# Patient Record
Sex: FEMALE | Race: WHITE | Employment: PART TIME | URBAN - METROPOLITAN AREA
[De-identification: names, ages, dates, MRNs, and addresses within clinical notes are randomized per-mention and may not be internally consistent; named-entity substitution may affect disease eponyms.]

---

## 2017-01-18 RX ORDER — LOSARTAN POTASSIUM 25 MG/1
TABLET ORAL
Qty: 90 TABLET | Refills: 0 | Status: CANCELLED | OUTPATIENT
Start: 2017-01-18

## 2017-01-20 RX ORDER — LOSARTAN POTASSIUM 25 MG/1
25 TABLET ORAL DAILY
Qty: 90 TABLET | Refills: 0 | Status: SHIPPED | OUTPATIENT
Start: 2017-01-20 | End: 2017-04-03

## 2017-01-25 ENCOUNTER — TELEPHONE (OUTPATIENT)
Dept: FAMILY MEDICINE CLINIC | Facility: CLINIC | Age: 52
End: 2017-01-25

## 2017-01-25 NOTE — TELEPHONE ENCOUNTER
Pt dropped off paperwork to be signed and may be dated with last exam date. Pt would like a call back at [phone]. Pt ok to leave .  Paperwork is in 's bin

## 2017-01-31 ENCOUNTER — OFFICE VISIT (OUTPATIENT)
Dept: FAMILY MEDICINE CLINIC | Facility: CLINIC | Age: 52
End: 2017-01-31

## 2017-01-31 VITALS
SYSTOLIC BLOOD PRESSURE: 120 MMHG | OXYGEN SATURATION: 98 % | BODY MASS INDEX: 27.74 KG/M2 | HEART RATE: 77 BPM | HEIGHT: 68 IN | DIASTOLIC BLOOD PRESSURE: 70 MMHG | WEIGHT: 183 LBS | TEMPERATURE: 99 F | RESPIRATION RATE: 16 BRPM

## 2017-01-31 DIAGNOSIS — Z12.11 SCREENING FOR COLON CANCER: ICD-10-CM

## 2017-01-31 DIAGNOSIS — Z23 NEED FOR VACCINATION: ICD-10-CM

## 2017-01-31 DIAGNOSIS — I10 ESSENTIAL HYPERTENSION: ICD-10-CM

## 2017-01-31 DIAGNOSIS — E66.3 OVERWEIGHT (BMI 25.0-29.9): ICD-10-CM

## 2017-01-31 DIAGNOSIS — E55.9 VITAMIN D DEFICIENCY: ICD-10-CM

## 2017-01-31 DIAGNOSIS — E53.8 VITAMIN B12 DEFICIENCY: ICD-10-CM

## 2017-01-31 DIAGNOSIS — Z00.00 ROUTINE GENERAL MEDICAL EXAMINATION AT A HEALTH CARE FACILITY: Primary | ICD-10-CM

## 2017-01-31 DIAGNOSIS — D50.8 OTHER IRON DEFICIENCY ANEMIA: ICD-10-CM

## 2017-01-31 DIAGNOSIS — Z00.00 LABORATORY EXAM ORDERED AS PART OF ROUTINE GENERAL MEDICAL EXAMINATION: ICD-10-CM

## 2017-01-31 DIAGNOSIS — Z71.85 IMMUNIZATION COUNSELING: ICD-10-CM

## 2017-01-31 PROCEDURE — 90472 IMMUNIZATION ADMIN EACH ADD: CPT | Performed by: FAMILY MEDICINE

## 2017-01-31 PROCEDURE — 99396 PREV VISIT EST AGE 40-64: CPT | Performed by: FAMILY MEDICINE

## 2017-01-31 PROCEDURE — 90632 HEPA VACCINE ADULT IM: CPT | Performed by: FAMILY MEDICINE

## 2017-01-31 PROCEDURE — 90715 TDAP VACCINE 7 YRS/> IM: CPT | Performed by: FAMILY MEDICINE

## 2017-01-31 PROCEDURE — 90471 IMMUNIZATION ADMIN: CPT | Performed by: FAMILY MEDICINE

## 2017-01-31 RX ORDER — MULTIVITAMIN
1 TABLET ORAL DAILY
COMMUNITY

## 2017-01-31 NOTE — H&P
CC: Annual Physical Exam    HPI:   Juju Watson is a 49year old female who presents for a complete physical exam. Symptoms: periods are regular. Patient complains of nothing.      Wt Readings from Last 6 Encounters:  01/31/17 : 183 lb  07/26/16 : 181 28.6 27.0-33.2 pg   MCHC 32.4 31.0-37.0 g/dL   RDW 13.1 11.5-16.0 %   RDW-SD 42.3 35.1-46.3 fL   Neutrophil Absolute Prelim 4.49 1.30-6.70 x10 (3) uL   Neutrophil Absolute 4.49 1.30-6.70 x10(3) uL   Lymphocyte Absolute 1.53 0.90-4.00 x10(3) uL   Monocyte A SYSTEMS:   GENERAL: feels well otherwise  SKIN: denies any unusual skin lesions  EYES:denies blurred vision or double vision  HEENT: denies nasal congestion, sinus pain or ST  LUNGS: denies shortness of breath with exertion  CARDIOVASCULAR: denies chest pa vaccine    Gyne 10/17/16  Mammo 11/8/16 -- WNL  Pt referred for screening colonoscopy. UA is normal.  Last eye exam -- due  Last dental exam -- 6 months ago  Form for work filled out. Pt' s weight is Body mass index is 27.83 kg/(m^2). , recommended l

## 2017-03-07 ENCOUNTER — OFFICE VISIT (OUTPATIENT)
Dept: FAMILY MEDICINE CLINIC | Facility: CLINIC | Age: 52
End: 2017-03-07

## 2017-03-07 VITALS
OXYGEN SATURATION: 98 % | BODY MASS INDEX: 26.98 KG/M2 | DIASTOLIC BLOOD PRESSURE: 78 MMHG | HEART RATE: 70 BPM | SYSTOLIC BLOOD PRESSURE: 144 MMHG | TEMPERATURE: 98 F | RESPIRATION RATE: 18 BRPM | HEIGHT: 68 IN | WEIGHT: 178 LBS

## 2017-03-07 DIAGNOSIS — R30.0 DYSURIA: Primary | ICD-10-CM

## 2017-03-07 LAB
APPEARANCE: CLEAR
MULTISTIX LOT#: ABNORMAL NUMERIC
PH, URINE: 5 (ref 4.5–8)
PROTEIN (URINE DIPSTICK): 30 MG/DL
SPECIFIC GRAVITY: 1.03 (ref 1–1.03)
URINE-COLOR: YELLOW
UROBILINOGEN,SEMI-QN: 0.2 MG/DL (ref 0–1.9)

## 2017-03-07 PROCEDURE — 99213 OFFICE O/P EST LOW 20 MIN: CPT | Performed by: NURSE PRACTITIONER

## 2017-03-07 PROCEDURE — 87086 URINE CULTURE/COLONY COUNT: CPT | Performed by: NURSE PRACTITIONER

## 2017-03-07 PROCEDURE — 87186 SC STD MICRODIL/AGAR DIL: CPT | Performed by: NURSE PRACTITIONER

## 2017-03-07 PROCEDURE — 87088 URINE BACTERIA CULTURE: CPT | Performed by: NURSE PRACTITIONER

## 2017-03-07 PROCEDURE — 81003 URINALYSIS AUTO W/O SCOPE: CPT | Performed by: NURSE PRACTITIONER

## 2017-03-07 RX ORDER — NITROFURANTOIN 25; 75 MG/1; MG/1
100 CAPSULE ORAL 2 TIMES DAILY
Qty: 14 CAPSULE | Refills: 0 | Status: SHIPPED | OUTPATIENT
Start: 2017-03-07 | End: 2017-03-14

## 2017-03-07 RX ORDER — PHENAZOPYRIDINE HYDROCHLORIDE 200 MG/1
200 TABLET, FILM COATED ORAL 3 TIMES DAILY PRN
Qty: 30 TABLET | Refills: 0 | Status: SHIPPED | OUTPATIENT
Start: 2017-03-07 | End: 2017-03-17

## 2017-03-07 NOTE — PROGRESS NOTES
Zeeshan Oliveira is a 49year old female. HPI:   Patient presents with urinary symptoms. Complaining of urinary frequency, urgency, dysuria, suprapubic pain. Denies back pain, fever, hematuria.   Duration: 1 day  Sexual activity: three days ago  Vaginal Negative mg/dL   BILIRUBIN neg Negative   KETONES (URINE DIPSTICK) trace Negative mg/dL   SPECIFIC GRAVITY 1.030 1.005 - 1.030   OCCULT BLOOD moderate Negative   PH, URINE 5.0 4.5 - 8.0   PROTEIN (URINE DIPSTICK) 30 Negative/Trace mg/dL   UROBILINOGEN,SEMI

## 2017-03-07 NOTE — PATIENT INSTRUCTIONS
· Wipe from front to back after using the bathroom every time. Consider wet wipes for cleaning. · Change your pad frequently during your menses.   · If sexually active urinate before and after sexual intercourse and wipe front to back  · Stay well hydrated

## 2017-04-04 RX ORDER — LOSARTAN POTASSIUM 25 MG/1
TABLET ORAL
Qty: 90 TABLET | Refills: 1 | Status: SHIPPED | OUTPATIENT
Start: 2017-04-04 | End: 2017-10-30

## 2017-04-26 ENCOUNTER — TELEPHONE (OUTPATIENT)
Dept: FAMILY MEDICINE CLINIC | Facility: CLINIC | Age: 52
End: 2017-04-26

## 2017-04-26 NOTE — TELEPHONE ENCOUNTER
Outreach call placed for HTN recheck. pt ws seen at walk in clinic and BP was noted as 144/78. she requires a BP check at walk in clinic(free) or nurse visit here. LMTCB.

## 2017-04-27 ENCOUNTER — NURSE ONLY (OUTPATIENT)
Dept: FAMILY MEDICINE CLINIC | Facility: CLINIC | Age: 52
End: 2017-04-27

## 2017-04-27 ENCOUNTER — TELEPHONE (OUTPATIENT)
Dept: FAMILY MEDICINE CLINIC | Facility: CLINIC | Age: 52
End: 2017-04-27

## 2017-04-27 VITALS — SYSTOLIC BLOOD PRESSURE: 128 MMHG | DIASTOLIC BLOOD PRESSURE: 76 MMHG

## 2017-04-27 DIAGNOSIS — I10 ESSENTIAL HYPERTENSION: Primary | ICD-10-CM

## 2017-04-27 PROCEDURE — 99211 OFF/OP EST MAY X REQ PHY/QHP: CPT | Performed by: FAMILY MEDICINE

## 2017-04-27 NOTE — TELEPHONE ENCOUNTER
Pt came in today for a BP check. During the visit pt stated that she wanted to let Dr Serena Valadez know that she has not gotten her period this month and she is usually 28 days regular. Her last two cycles were 3/1/17 and 3/23/17.   Advised pt that I would let

## 2017-04-28 NOTE — TELEPHONE ENCOUNTER
Call to pt's cell reaches unidentified voice mail. Left m req call back to triage nurse today for dr jovna wallace her question from yesterday. Provided ofc phone hours.

## 2017-08-21 ENCOUNTER — CHARTING TRANS (OUTPATIENT)
Dept: OTHER | Age: 52
End: 2017-08-21

## 2017-08-21 ENCOUNTER — LAB SERVICES (OUTPATIENT)
Dept: OTHER | Age: 52
End: 2017-08-21

## 2017-08-21 LAB
APPEARANCE: CLEAR
COLOR: NORMAL

## 2017-08-25 LAB — BACTERIA UR CULT: NORMAL

## 2017-08-28 ENCOUNTER — CHARTING TRANS (OUTPATIENT)
Dept: OTHER | Age: 52
End: 2017-08-28

## 2017-09-25 DIAGNOSIS — Z13.29 SCREENING FOR THYROID DISORDER: ICD-10-CM

## 2017-09-25 DIAGNOSIS — Z13.220 ENCOUNTER FOR LIPID SCREENING FOR CARDIOVASCULAR DISEASE: ICD-10-CM

## 2017-09-25 DIAGNOSIS — Z13.6 ENCOUNTER FOR LIPID SCREENING FOR CARDIOVASCULAR DISEASE: ICD-10-CM

## 2017-09-25 DIAGNOSIS — I10 ESSENTIAL HYPERTENSION: Primary | ICD-10-CM

## 2017-09-25 NOTE — PROGRESS NOTES
Emilie Solis notified us patient needs her labs drawn so I called her to tell her labs are pended since January. Since pended so long, now due for others I pended. She will get drawn this week.

## 2017-10-20 ENCOUNTER — LAB ENCOUNTER (OUTPATIENT)
Dept: LAB | Age: 52
End: 2017-10-20
Attending: FAMILY MEDICINE
Payer: COMMERCIAL

## 2017-10-20 DIAGNOSIS — E53.8 VITAMIN B12 DEFICIENCY: ICD-10-CM

## 2017-10-20 DIAGNOSIS — Z13.220 ENCOUNTER FOR LIPID SCREENING FOR CARDIOVASCULAR DISEASE: ICD-10-CM

## 2017-10-20 DIAGNOSIS — Z13.6 ENCOUNTER FOR LIPID SCREENING FOR CARDIOVASCULAR DISEASE: ICD-10-CM

## 2017-10-20 DIAGNOSIS — Z00.00 ROUTINE GENERAL MEDICAL EXAMINATION AT A HEALTH CARE FACILITY: ICD-10-CM

## 2017-10-20 DIAGNOSIS — I10 ESSENTIAL HYPERTENSION: ICD-10-CM

## 2017-10-20 DIAGNOSIS — E55.9 VITAMIN D DEFICIENCY: ICD-10-CM

## 2017-10-20 DIAGNOSIS — Z13.29 SCREENING FOR THYROID DISORDER: ICD-10-CM

## 2017-10-20 DIAGNOSIS — Z00.00 LABORATORY EXAM ORDERED AS PART OF ROUTINE GENERAL MEDICAL EXAMINATION: ICD-10-CM

## 2017-10-20 DIAGNOSIS — D50.8 OTHER IRON DEFICIENCY ANEMIA: ICD-10-CM

## 2017-10-20 PROCEDURE — 80050 GENERAL HEALTH PANEL: CPT | Performed by: FAMILY MEDICINE

## 2017-10-20 PROCEDURE — 83540 ASSAY OF IRON: CPT | Performed by: FAMILY MEDICINE

## 2017-10-20 PROCEDURE — 82728 ASSAY OF FERRITIN: CPT | Performed by: FAMILY MEDICINE

## 2017-10-20 PROCEDURE — 82306 VITAMIN D 25 HYDROXY: CPT | Performed by: FAMILY MEDICINE

## 2017-10-20 PROCEDURE — 82607 VITAMIN B-12: CPT | Performed by: FAMILY MEDICINE

## 2017-10-20 PROCEDURE — 80061 LIPID PANEL: CPT | Performed by: FAMILY MEDICINE

## 2017-10-20 PROCEDURE — 36415 COLL VENOUS BLD VENIPUNCTURE: CPT | Performed by: FAMILY MEDICINE

## 2017-10-20 PROCEDURE — 83550 IRON BINDING TEST: CPT | Performed by: FAMILY MEDICINE

## 2017-10-30 RX ORDER — LOSARTAN POTASSIUM 25 MG/1
TABLET ORAL
Qty: 90 TABLET | Refills: 0 | Status: SHIPPED | OUTPATIENT
Start: 2017-10-30 | End: 2018-01-09

## 2017-11-01 ENCOUNTER — OFFICE VISIT (OUTPATIENT)
Dept: FAMILY MEDICINE CLINIC | Facility: CLINIC | Age: 52
End: 2017-11-01

## 2017-11-01 VITALS
WEIGHT: 185 LBS | HEART RATE: 84 BPM | DIASTOLIC BLOOD PRESSURE: 70 MMHG | RESPIRATION RATE: 14 BRPM | HEIGHT: 68 IN | BODY MASS INDEX: 28.04 KG/M2 | SYSTOLIC BLOOD PRESSURE: 128 MMHG

## 2017-11-01 DIAGNOSIS — F43.9 STRESS: ICD-10-CM

## 2017-11-01 DIAGNOSIS — D50.0 IRON DEFICIENCY ANEMIA DUE TO CHRONIC BLOOD LOSS: ICD-10-CM

## 2017-11-01 DIAGNOSIS — N92.0 MENORRHAGIA WITH REGULAR CYCLE: ICD-10-CM

## 2017-11-01 DIAGNOSIS — I10 ESSENTIAL HYPERTENSION: Primary | ICD-10-CM

## 2017-11-01 DIAGNOSIS — E66.3 OVERWEIGHT (BMI 25.0-29.9): ICD-10-CM

## 2017-11-01 DIAGNOSIS — E53.8 VITAMIN B12 DEFICIENCY: ICD-10-CM

## 2017-11-01 DIAGNOSIS — E55.9 VITAMIN D DEFICIENCY: ICD-10-CM

## 2017-11-01 PROCEDURE — 99214 OFFICE O/P EST MOD 30 MIN: CPT | Performed by: FAMILY MEDICINE

## 2017-11-01 RX ORDER — MELATONIN
325
COMMUNITY

## 2017-11-01 NOTE — PROGRESS NOTES
Marisol Melendrez is a 47year old female. HPI:   Pt. Is here for a BP check. HTN -- 123-126/75-77 -- BP at home. Still having heavy periods and has hx of iron def. Anemia because of it. Will consider ablation. Will start daily iron.   A lot os stress -FERRITIN   Result Value Ref Range   Ferritin 3.2 (L) 10.0 - 291.0 ng/mL   -VITAMIN B12   Result Value Ref Range   Vitamin B12 472 193 - 986 pg/mL   -COMP METABOLIC PANEL (14)   Result Value Ref Range   Glucose 76 70 - 99 mg/dL   BUN 14 8 - 20 mg/dL   Cr Cuff Size: adult)   Pulse 84   Resp 14   Ht 68\"   Wt 185 lb   LMP 11/01/2017   BMI 28.13 kg/m²   GENERAL: well developed, well nourished,in no apparent distress  SKIN: no rashes,no suspicious lesions  NECK: supple,no adenopathy,no bruits; thyromegaly  GABRIELA

## 2017-11-30 ENCOUNTER — TELEPHONE (OUTPATIENT)
Dept: FAMILY MEDICINE CLINIC | Facility: CLINIC | Age: 52
End: 2017-11-30

## 2017-11-30 NOTE — TELEPHONE ENCOUNTER
She is going through a heavy pad an hour -- can we call OB and see if she should go to the ER or their office -- I would like her to be seen by someone

## 2017-11-30 NOTE — TELEPHONE ENCOUNTER
Pt states she had an ablation 11/13/17 to help with her iron. Ablation procedure was done by gynecologist out of [facility], Dr. Gosia Ureña, per pt.  States her period started 11/28 w/spotting, yesterday it was heavy and continues being heavy today without clott

## 2017-11-30 NOTE — TELEPHONE ENCOUNTER
Pt was called . Her Dr was out of [facility]. She has heard from her OBGYN. Her bleeding this afternoon much less. She was told\"this can happen,rest and call tomorrow with update to OB and they will make decisions based on her symptoms. \"pt was ok with this. Just

## 2017-12-26 ENCOUNTER — TELEPHONE (OUTPATIENT)
Dept: FAMILY MEDICINE CLINIC | Facility: CLINIC | Age: 52
End: 2017-12-26

## 2017-12-26 NOTE — TELEPHONE ENCOUNTER
FYI:  Pt had ablation on November 13 did not seem to work b/c pt got a period on November 28 (lasted 6 days) and again on December 23 (still going on).

## 2018-01-09 ENCOUNTER — PATIENT MESSAGE (OUTPATIENT)
Dept: FAMILY MEDICINE CLINIC | Facility: CLINIC | Age: 53
End: 2018-01-09

## 2018-01-09 RX ORDER — LOSARTAN POTASSIUM 25 MG/1
TABLET ORAL
Qty: 90 TABLET | Refills: 1 | Status: SHIPPED | OUTPATIENT
Start: 2018-01-09 | End: 2018-05-28

## 2018-01-10 NOTE — TELEPHONE ENCOUNTER
From: Yas Judd DO  Sent: 1/9/2018 6:42 PM CST  Subject: Non-Urgent Medical Question    HI  Happy New Year!!  Question: Being the ablation didn't seem to work at all: ((( Should I take the step to get a hysterectomy?  I really don

## 2018-05-29 RX ORDER — LOSARTAN POTASSIUM 25 MG/1
TABLET ORAL
Qty: 90 TABLET | Refills: 0 | Status: SHIPPED | OUTPATIENT
Start: 2018-05-29 | End: 2018-08-07

## 2018-08-07 RX ORDER — LOSARTAN POTASSIUM 25 MG/1
TABLET ORAL
Qty: 90 TABLET | Refills: 0 | Status: SHIPPED | OUTPATIENT
Start: 2018-08-07

## 2018-11-03 VITALS
RESPIRATION RATE: 18 BRPM | TEMPERATURE: 98.4 F | DIASTOLIC BLOOD PRESSURE: 70 MMHG | BODY MASS INDEX: 26.83 KG/M2 | HEIGHT: 68 IN | WEIGHT: 177 LBS | SYSTOLIC BLOOD PRESSURE: 130 MMHG | HEART RATE: 84 BPM

## 2018-11-03 VITALS
TEMPERATURE: 98.3 F | OXYGEN SATURATION: 98 % | SYSTOLIC BLOOD PRESSURE: 114 MMHG | HEART RATE: 95 BPM | DIASTOLIC BLOOD PRESSURE: 78 MMHG | RESPIRATION RATE: 18 BRPM

## 2019-02-25 ENCOUNTER — PATIENT MESSAGE (OUTPATIENT)
Dept: FAMILY MEDICINE CLINIC | Facility: CLINIC | Age: 54
End: 2019-02-25

## 2019-02-26 NOTE — TELEPHONE ENCOUNTER
From: Emelyn Guerra  To: Enrrique Garcia DO  Sent: 2/25/2019 4:29 PM CST  Subject: Non-Urgent Medical Question    [facility]  Hope your year is going well. Hope your birthday was good. We are enjoying [place] my parents are here until March 10th.  I was wondering

## 2019-03-01 ENCOUNTER — PATIENT MESSAGE (OUTPATIENT)
Dept: FAMILY MEDICINE CLINIC | Facility: CLINIC | Age: 54
End: 2019-03-01

## 2019-03-04 NOTE — TELEPHONE ENCOUNTER
From: Kiarra Juárez  To: Froylan Means DO  Sent: 3/1/2019 10:03 AM CST  Subject: Non-Urgent Medical Question    Hi  I have been chatting with Michaela Schmitz. There is a quest on [address] [facility] in the [place].  If you could send my labs there

## 2020-03-03 ENCOUNTER — PATIENT OUTREACH (OUTPATIENT)
Dept: FAMILY MEDICINE CLINIC | Facility: CLINIC | Age: 55
End: 2020-03-03

## 2020-03-03 NOTE — PROGRESS NOTES
Patient is due for annual physical/routine health maintenance.  Please call patient to schedule- thanks

## 2024-02-29 ENCOUNTER — APPOINTMENT (RX ONLY)
Dept: URBAN - METROPOLITAN AREA CLINIC 73 | Facility: CLINIC | Age: 59
Setting detail: DERMATOLOGY
End: 2024-02-29

## 2024-02-29 DIAGNOSIS — L81.4 OTHER MELANIN HYPERPIGMENTATION: ICD-10-CM

## 2024-02-29 DIAGNOSIS — D22 MELANOCYTIC NEVI: ICD-10-CM

## 2024-02-29 DIAGNOSIS — L82.0 INFLAMED SEBORRHEIC KERATOSIS: ICD-10-CM

## 2024-02-29 DIAGNOSIS — B07.8 OTHER VIRAL WARTS: ICD-10-CM

## 2024-02-29 DIAGNOSIS — Z71.89 OTHER SPECIFIED COUNSELING: ICD-10-CM

## 2024-02-29 PROBLEM — D22.5 MELANOCYTIC NEVI OF TRUNK: Status: ACTIVE | Noted: 2024-02-29

## 2024-02-29 PROBLEM — D23.72 OTHER BENIGN NEOPLASM OF SKIN OF LEFT LOWER LIMB, INCLUDING HIP: Status: ACTIVE | Noted: 2024-02-29

## 2024-02-29 PROCEDURE — 99203 OFFICE O/P NEW LOW 30 MIN: CPT | Mod: 25

## 2024-02-29 PROCEDURE — ? COUNSELING

## 2024-02-29 PROCEDURE — ? TREATMENT REGIMEN

## 2024-02-29 PROCEDURE — ? SUNSCREEN RECOMMENDATIONS

## 2024-02-29 PROCEDURE — ? LIQUID NITROGEN

## 2024-02-29 PROCEDURE — 17110 DESTRUCTION B9 LES UP TO 14: CPT

## 2024-02-29 PROCEDURE — ? PATIENT SPECIFIC COUNSELING

## 2024-02-29 PROCEDURE — ? DEFER

## 2024-02-29 ASSESSMENT — LOCATION DETAILED DESCRIPTION DERM
LOCATION DETAILED: LEFT INFERIOR MEDIAL UPPER BACK
LOCATION DETAILED: LEFT SUPERIOR CENTRAL MALAR CHEEK
LOCATION DETAILED: LEFT DISTAL DORSAL THUMB
LOCATION DETAILED: LEFT INFERIOR MEDIAL MIDBACK
LOCATION DETAILED: RIGHT MID-UPPER BACK
LOCATION DETAILED: LEFT SMALL FINGER LUNULA
LOCATION DETAILED: LEFT DISTAL ULNAR THUMB

## 2024-02-29 ASSESSMENT — LOCATION ZONE DERM
LOCATION ZONE: FACE
LOCATION ZONE: FINGER
LOCATION ZONE: TRUNK

## 2024-02-29 ASSESSMENT — LOCATION SIMPLE DESCRIPTION DERM
LOCATION SIMPLE: LEFT THUMB
LOCATION SIMPLE: LEFT UPPER BACK
LOCATION SIMPLE: LEFT CHEEK
LOCATION SIMPLE: RIGHT UPPER BACK
LOCATION SIMPLE: LEFT SMALL FINGERNAIL
LOCATION SIMPLE: LEFT LOWER BACK

## 2024-02-29 NOTE — HPI: EVALUATION OF SKIN LESION(S)
How Severe Are Your Spot(S)?: moderate
Hpi Title: Evaluation of a Skin Lesion
Additional History: Pt presents for fbse, states would like spots removed today

## 2024-02-29 NOTE — PROCEDURE: LIQUID NITROGEN
Post-Care Instructions: I reviewed with the patient in detail post-care instructions. Patient is to wear sunprotection, and avoid picking at any of the treated lesions. Pt may apply Vaseline to crusted or scabbing areas.
Detail Level: Detailed
Show Aperture Variable?: Yes
Duration Of Freeze Thaw-Cycle (Seconds): 5-10
Render Post-Care Instructions In Note?: no
Medical Necessity Information: It is in your best interest to select a reason for this procedure from the list below. All of these items fulfill various CMS LCD requirements except the new and changing color options.
Number Of Freeze-Thaw Cycles: 2 freeze-thaw cycles
Spray Paint Text: The liquid nitrogen was applied to the skin utilizing a spray paint frosting technique.
Medical Necessity Clause: This procedure was medically necessary because the lesions that were treated were:
Consent: The patient's consent was obtained including but not limited to risks of crusting, scabbing, blistering, scarring, darker or lighter pigmentary change, recurrence, incomplete removal and infection.

## 2024-03-22 ENCOUNTER — APPOINTMENT (RX ONLY)
Dept: URBAN - METROPOLITAN AREA CLINIC 73 | Facility: CLINIC | Age: 59
Setting detail: DERMATOLOGY
End: 2024-03-22

## 2024-03-22 DIAGNOSIS — L28.0 LICHEN SIMPLEX CHRONICUS: ICD-10-CM

## 2024-03-22 PROBLEM — L30.9 DERMATITIS, UNSPECIFIED: Status: ACTIVE | Noted: 2024-03-22

## 2024-03-22 PROBLEM — D23.72 OTHER BENIGN NEOPLASM OF SKIN OF LEFT LOWER LIMB, INCLUDING HIP: Status: ACTIVE | Noted: 2024-03-22

## 2024-03-22 PROCEDURE — ? PATIENT SPECIFIC COUNSELING

## 2024-03-22 PROCEDURE — ? TREATMENT REGIMEN

## 2024-03-22 PROCEDURE — ? PRESCRIPTION

## 2024-03-22 PROCEDURE — ? COUNSELING

## 2024-03-22 PROCEDURE — 99214 OFFICE O/P EST MOD 30 MIN: CPT | Mod: 25

## 2024-03-22 PROCEDURE — ? BIOPSY BY PUNCH METHOD

## 2024-03-22 PROCEDURE — 11104 PUNCH BX SKIN SINGLE LESION: CPT

## 2024-03-22 RX ORDER — CLOBETASOL PROPIONATE 0.5 MG/G
OINTMENT TOPICAL
Qty: 30 | Refills: 0 | Status: ERX | COMMUNITY
Start: 2024-03-22

## 2024-03-22 RX ADMIN — CLOBETASOL PROPIONATE: 0.5 OINTMENT TOPICAL at 00:00

## 2024-03-22 ASSESSMENT — LOCATION SIMPLE DESCRIPTION DERM: LOCATION SIMPLE: RIGHT WRIST

## 2024-03-22 ASSESSMENT — LOCATION DETAILED DESCRIPTION DERM: LOCATION DETAILED: RIGHT VENTRAL WRIST

## 2024-03-22 ASSESSMENT — LOCATION ZONE DERM: LOCATION ZONE: ARM

## 2024-03-22 NOTE — PROCEDURE: TREATMENT REGIMEN
Initiate Treatment: clobetasol 0.05 % topical ointment \\nSig: apply to affected areas on hand bid x 7-10 days, then break. Repeat prn flares
Detail Level: Zone

## 2024-03-22 NOTE — PROCEDURE: BIOPSY BY PUNCH METHOD
Detail Level: Detailed
Was A Bandage Applied: Yes
Punch Size In Mm: 5
Size Of Lesion In Cm (Optional): 0
Depth Of Punch Biopsy: dermis
Biopsy Type: H and E
Anesthesia Type: 2% lidocaine with epinephrine
Anesthesia Volume In Cc: 0.5
Hemostasis: None
Epidermal Sutures: 4-0 Ethilon
Wound Care: Vaseline
Dressing: pressure dressing
Suture Removal: 7 days
Patient Will Remove Sutures At Home?: No
Consent: Written consent was obtained and risks were reviewed including but not limited to scarring, infection, bleeding, scabbing, incomplete removal, nerve damage and allergy to anesthesia.
Post-Care Instructions: I reviewed with the patient in detail post-care instructions. Patient is to keep the biopsy site dry overnight, and then apply bacitracin twice daily until healed. Patient may apply hydrogen peroxide soaks to remove any crusting.
Home Suture Removal Text: Patient was provided a home suture removal kit and will remove their sutures at home.  If they have any questions or difficulties they will call the office.
Notification Instructions: Patient will be notified of biopsy results. However, patient instructed to call the office if not contacted within 2 weeks.
Billing Type: Third-Party Bill
Information: Selecting Yes will display possible errors in your note based on the variables you have selected. This validation is only offered as a suggestion for you. PLEASE NOTE THAT THE VALIDATION TEXT WILL BE REMOVED WHEN YOU FINALIZE YOUR NOTE. IF YOU WANT TO FAX A PRELIMINARY NOTE YOU WILL NEED TO TOGGLE THIS TO 'NO' IF YOU DO NOT WANT IT IN YOUR FAXED NOTE.

## (undated) NOTE — MR AVS SNAPSHOT
[facility] [address] [facility] [address] [facility]  [place] [facility] CRITICAL ACCESS [facility] [address] [phone]               Thank you for choosing us for your health care visit with ANA Mortensen. We are glad to serve you and happy to provide you with this summary of your visit.   Please h Today's Vital Signs     BP Pulse Temp Height Weight BMI    144/78 mmHg 70 97.8 °F (36.6 °C) (Oral) 68\" 178 lb 27.07 kg/m2         Current Medications          This list is accurate as of: 3/7/17  4:30 PM.  Always use your most recent med list. You can access your MyChart to more actively manage your health care and view more details from this visit by going to [email or web address].   If you've recently had a stay at the Hospital you can access your discharge instructions in [address] by [place]

## (undated) NOTE — MR AVS SNAPSHOT
[facility] [address] [id number]               Thank you for choosing us for your health care visit with DO Jevon.   We are glad to serve you and happy to provide you with this summary Urine Dip, auto without Micro    Complete by:  As directed    Assoc Dx:  Laboratory exam ordered as part of routine general medical examination [Z00.00], Routine general medical examination at a health care facility [Z00.00]           CBC W Differential W Your physician has referred you to a specialist.  Your physician or the clinic staff will provide you with the phone number you should call to schedule your appointment.      If you are confident that your benefit plan will not require a referral or authori including white bread, rice and pasta   Eat plenty of protein, keep the fat content low Sugars:  sodas and sports drinks, candies and desserts   Eat plenty of low-fat dairy products High fat meats and dairy   Choose whole grain products Foods high in sodiu